# Patient Record
Sex: FEMALE | Race: WHITE | Employment: FULL TIME | URBAN - METROPOLITAN AREA
[De-identification: names, ages, dates, MRNs, and addresses within clinical notes are randomized per-mention and may not be internally consistent; named-entity substitution may affect disease eponyms.]

---

## 2018-09-07 ENCOUNTER — HOSPITAL ENCOUNTER (OUTPATIENT)
Dept: PHYSICAL THERAPY | Age: 38
Discharge: HOME | End: 2018-09-07
Payer: COMMERCIAL

## 2018-09-07 PROCEDURE — 97110 THERAPEUTIC EXERCISES: CPT

## 2018-09-07 PROCEDURE — 97161 PT EVAL LOW COMPLEX 20 MIN: CPT

## 2018-09-07 NOTE — PROGRESS NOTES
PHYSICAL THERAPY - DAILY TREATMENT NOTE Patient Name: Brendon Reilly        Date: 2018 : 1980   YES Patient  Verified Visit #:     Insurance: Payor: Emily Naidu / Plan: [employer]  CAPITATED PT / Product Type: Commerical / In time: 8 Out time: 8:40 Total Treatment Time: 40 Medicare Time Tracking (below) Total Timed Codes (min):  10 1:1 Treatment Time:  40 TREATMENT AREA =  L knee SUBJECTIVE Pain Level (on 0 to 10 scale):  2 / 10 Medication Changes/New allergies or changes in medical history, any new surgeries or procedures? NO    If yes, update Summary List  
Subjective Functional Status/Changes:  []  No changes reported See eval  
 
  
 
OBJECTIVE Modality rationale:     
 min [] Estim, type:   
                                      []  att     []  unatt     []  w/US     []  w/ice    []  w/heat  
 min []  Mechanical Traction: type/lbs   
                                           []  pro   []  sup   []  int   []  cont  
 min []  Ultrasound, settings/location:    
 min []  Iontophoresis:  []  take home patch w/ dexamethazone  
 min                                []  in clinic w/ dexamethazone  
 min []  Ice     []  Heat     position:   
 min []  Other:   
 
10 min Therapeutic Exercise:  [x]  See flow sheet  
[]  Other:     
[]  Added:    
to improve (function):   
[]  Changed:    
to improve (function):   
 
 min Therapeutic Activity:   
 
- min Manual Therapy:   
 
 min Gait Training:   
 
 min Patient Education:  YES  Reviewed HEP []  Progressed/Changed HEP based on: Other Objective/Functional Measures: 
 
See eval 
  
Post Treatment Pain Level (on 0 to 10) scale:   1  / 10 ASSESSMENT 
 
[]  See Progress Note/Recertification Patient will continue to benefit from skilled therapy to address remaining functional deficits: see eval  
Progress toward goals / Updated goals: PLAN 
 
 [x]  Upgrade activities as tolerated YES Continue plan of care  
[]  Discharge due to :   
[]  Other:   
 
Therapist: Jr Schneider PT Date: 9/7/2018 Time: 8:46 AM

## 2018-09-07 NOTE — PROGRESS NOTES
[facility] [address] [facility] PHYSICAL THERAPY AT [address] [facility], [address] [place] [address], [address] [place] [address] Drive, [address] [place] [address] - Phone: [phone]  Fax: [phone] PLAN OF CARE / STATEMENT OF MEDICAL NECESSITY FOR PHYSICAL THERAPY SERVICES Patient Name: Elvis Monroy : 1980 Medical  
Diagnosis: Left knee pain [M25.562] Treatment Diagnosis: L knee pain Onset Date: Chronic Referral Source: Veronique Sanchez MD Start of Care [facility] [place]): 2018 Prior Hospitalization: See medical history Provider #: [id number] Prior Level of Function: Chronic L knee pain Comorbidities: OA, Asthma Medications: Verified on Patient Summary List  
The Plan of Care and following information is based on the information from the initial evaluation.  
============================================================================== Assessment / key information:   Elvis Monroy is an active athletic 45 y.o. female with a chief c/o L knee pain, up to 5/10. The patient reports having a L ACL repair in . She has recently begun to have L knee pain with sports and activity. She has full L knee ext, but mildly limited L knee flexion. Ligamentous tests were negative, as were patellar compression tests. Her multi-segmental flexion was dysfunctional (due to decreased HS length). Her multi-segmental ext was also dysfunctional (due to decreased hip flexor length). Deep squat was also dysfunctional.  The patient would benefit from skilled physical therapy at this time.=========================================================================================== Eval Complexity: History LOW Complexity : Zero comorbidities / personal factors that will impact the outcome / POC;  Examination  HIGH Complexity : 4+ Standardized tests and measures addressing body structure, function, activity limitation and / or participation in recreation ; Presentation LOW Complexity : Stable, uncomplicated ;  Decision Making MEDIUM Complexity : FOTO score of 26-74; Overall Complexity LOW Problem List: pain affecting function, decrease ROM, decrease strength, edema affecting function, decrease ADL/ functional abilitiies, decrease activity tolerance and decrease flexibility/ joint mobility Treatment Plan may include any combination of the following: Therapeutic exercise, Therapeutic activities, Neuromuscular re-education, Physical agent/modality, Manual therapy and Patient education Patient / Family readiness to learn indicated by: asking questions, trying to perform skills and interest 
Persons(s) to be included in education: patient (P) Barriers to Learning/Limitations: None Measures taken:   
Patient Goal (s): \"get stronger and avoid surgery\" Patient self reported health status: good Rehabilitation Potential: good ? Short Term Goals: To be accomplished in  2  weeks: 1. Decrease max L knee pain to < 3/10 2. Pt compliant with HEP 
? Long Term Goals: To be accomplished in  4-5  weeks: 1. Pt able to run, jump and cut without L knee pain 2. All ADL's without L knee pain 3. Pt to report >=+4, on GROC, to indicate increased function 4. Patient Independent with HEP/selfcare Frequency / Duration:   Patient to be seen 2-3  times per week for 4-8  weeks: 
Patient / Caregiver education and instruction: self care, activity modification and exercisesTherapist Signature: Flip Plascencia PT, MDT Date: 9/7/2018 Certification Period: NA Time: 8:47 AM  
=========================================================================================== I certify that the above Physical Therapy Services are being furnished while the patient is under my care. I agree with the treatment plan and certify that this therapy is necessary. Physician Signature:       Date:      Time:  Please sign and return to [facility] or you may fax the signed copy to [phone]. Thank you.

## 2018-09-11 ENCOUNTER — HOSPITAL ENCOUNTER (OUTPATIENT)
Dept: PHYSICAL THERAPY | Age: 38
End: 2018-09-11
Payer: COMMERCIAL

## 2018-09-12 ENCOUNTER — HOSPITAL ENCOUNTER (OUTPATIENT)
Dept: PHYSICAL THERAPY | Age: 38
Discharge: HOME | End: 2018-09-12
Payer: COMMERCIAL

## 2018-09-12 PROCEDURE — 97110 THERAPEUTIC EXERCISES: CPT

## 2018-09-12 NOTE — PROGRESS NOTES
PHYSICAL THERAPY - DAILY TREATMENT NOTE   Patient Name: Shae Silverio        Date: 2018 : 1980   YES Patient  Verified Visit #:     Insurance: Payor: Jeramy Izaguirre / Plan: [employer]  CAPITATED PT / Product Type: Commerical / In time: 5:30 Out time: 6:20 Total Treatment Time: 50 TREATMENT AREA = Left knee pain [M25.562] SUBJECTIVE Pain Level (on 0 to 10 scale):  0  / 10 Medication Changes/New allergies or changes in medical history, any new surgeries or procedures? NO    If yes, update Summary List  
Subjective Functional Status/Changes:  []  No changes reported No L knee pain OBJECTIVE 50 min Therapeutic Exercise:  [x]  See flow sheet Rationale:      increase ROM, increase strength and improve balance to improve the patients ability to perform all LE ADL's Throughout tx min Patient Education:  Moiz Cervantes []  Progressed/Changed HEP based on: Other Objective/Functional Measures: 
 
Initiated therex Post Treatment Pain Level (on 0 to 10) scale:   0  / 10 ASSESSMENT Assessment/Changes in Function:  
 
B knee DVC during SL TRX squat and balance lunge 
  
[]  See Progress Note/Recertification Patient will continue to benefit from skilled PT services to modify and progress therapeutic interventions, address functional mobility deficits, address strength deficits, analyze and cue movement patterns, analyze and modify body mechanics/ergonomics, assess and modify postural abnormalities and address imbalance/dizziness to attain remaining goals. Progress toward goals / Updated goals: 
 
Continue strengthening to meet all goals PLAN [x]  Upgrade activities as tolerated YES Continue plan of care  
[]  Discharge due to :   
[]  Other:   
 
Therapist: Madan Carroll PT Date: 2018 Time: 6:42 PM  
Future Appointments Date Time Provider Rupinder Caruso 2018 9:30 AM Madan Carroll PT REHAB CENTER AT [facility]  
 9/18/2018 7:00 AM Silvino Mercedes, PT REHAB CENTER AT [facility]  
9/21/2018 7:30 AM Merle Farnsworth PTA REHAB CENTER AT [facility]  
9/25/2018 7:00 AM Silvino Mercedes, PT REHAB CENTER AT [facility]

## 2018-09-18 ENCOUNTER — HOSPITAL ENCOUNTER (OUTPATIENT)
Dept: PHYSICAL THERAPY | Age: 38
Discharge: HOME | End: 2018-09-18
Payer: COMMERCIAL

## 2018-09-18 PROCEDURE — 97110 THERAPEUTIC EXERCISES: CPT

## 2018-09-18 NOTE — PROGRESS NOTES
PHYSICAL THERAPY - DAILY TREATMENT NOTE   Patient Name: Oriana Engel        Date: 2018 : 1980   YES Patient  Verified Visit #:   3   of   12  Insurance: Payor: Jaymie Mazariegos / Plan: [employer]  CAPITATED PT / Product Type: Commerical / In time: 7:05 Out time: 7:48 Total Treatment Time: 37 TREATMENT AREA = Left knee pain [M25.562] SUBJECTIVE Pain Level (on 0 to 10 scale):  0  / 10 Medication Changes/New allergies or changes in medical history, any new surgeries or procedures? NO    If yes, update Summary List  
Subjective Functional Status/Changes:  []  No changes reported No pain or soreness after last session OBJECTIVE 43 min Therapeutic Exercise:  [x]  See flow sheet Rationale:      increase strength to improve the patients ability to return to dynamic LE activities Throughout Tx min Patient Education:  Neha Lackey []  Progressed/Changed HEP based on: Other Objective/Functional Measures: 
 
Increased wall sit time and added ladder drills Post Treatment Pain Level (on 0 to 10) scale:   0  / 10 ASSESSMENT Assessment/Changes in Function: No increased pain with progression of therex and plyometrric training 
  
[]  See Progress Note/Recertification Patient will continue to benefit from skilled PT services to modify and progress therapeutic interventions, address functional mobility deficits, address strength deficits, analyze and cue movement patterns, analyze and modify body mechanics/ergonomics, assess and modify postural abnormalities and address imbalance/dizziness to attain remaining goals. Progress toward goals / Updated goals: 
 
Continue strengthening to meet all goals PLAN [x]  Upgrade activities as tolerated YES Continue plan of care  
[]  Discharge due to :   
[]  Other:   
 
Therapist: Vaishnavi Gonzalez PT Date: 2018 Time: 7:06 AM  
Future Appointments Date Time Provider Rupinder Caruso 9/21/2018 7:30 AM Kristen Hernandez, PTA REHAB CENTER AT [facility] [address]  
9/25/2018 7:00 AM Porfirio Velasquez, PT REHAB CENTER AT [facility] [address]

## 2018-09-25 ENCOUNTER — APPOINTMENT (OUTPATIENT)
Dept: PHYSICAL THERAPY | Age: 38
End: 2018-09-25
Payer: COMMERCIAL

## 2018-09-26 ENCOUNTER — HOSPITAL ENCOUNTER (OUTPATIENT)
Dept: PHYSICAL THERAPY | Age: 38
Discharge: HOME | End: 2018-09-26
Payer: COMMERCIAL

## 2018-09-26 PROCEDURE — 97110 THERAPEUTIC EXERCISES: CPT

## 2018-09-26 NOTE — PROGRESS NOTES
PHYSICAL THERAPY - DAILY TREATMENT NOTE  8 Patient Name: Luc Castro        Date: 2018 : 1980   YES Patient  Verified Visit #:      12  Insurance: Payor: Stan Márquez / Plan: [employer]  CAPITATED PT / Product Type: Commerical / In time: 5:20 Out time: 6:03 Total Treatment Time: 37 TREATMENT AREA = Left knee pain [M25.562] SUBJECTIVE Pain Level (on 0 to 10 scale):  0  / 10 Medication Changes/New allergies or changes in medical history, any new surgeries or procedures? NO    If yes, update Summary List  
Subjective Functional Status/Changes:  []  No changes reported Played golf with other day with 3/10 L knee pain OBJECTIVE 43 min Therapeutic Exercise:  [x]  See flow sheet Rationale:      increase ROM and increase strength to improve the patients ability to perform all LE ADL's Throughout Tx min Patient Education:  Jeny No []  Progressed/Changed HEP based on: Other Objective/Functional Measures: 
 
Increased height of lateral step downs Post Treatment Pain Level (on 0 to 10) scale:   0  / 10 ASSESSMENT Assessment/Changes in Function: No increased pain with progression of therex 
  
[]  See Progress Note/Recertification Patient will continue to benefit from skilled PT services to modify and progress therapeutic interventions, address functional mobility deficits, address ROM deficits, address strength deficits, analyze and cue movement patterns, analyze and modify body mechanics/ergonomics, assess and modify postural abnormalities and address imbalance/dizziness to attain remaining goals. Progress toward goals / Updated goals: 
 
Continue strengthening to meet all goals PLAN [x]  Upgrade activities as tolerated YES Continue plan of care  
[]  Discharge due to :   
[]  Other:   
 
Therapist: Emeli Durham PT Date: 2018 Time: 6:03 PM  
No future appointments.

## 2018-10-01 ENCOUNTER — HOSPITAL ENCOUNTER (OUTPATIENT)
Dept: PHYSICAL THERAPY | Age: 38
Discharge: HOME | End: 2018-10-01
Payer: COMMERCIAL

## 2018-10-01 PROCEDURE — 97110 THERAPEUTIC EXERCISES: CPT

## 2018-10-01 NOTE — PROGRESS NOTES
PHYSICAL THERAPY - DAILY TREATMENT NOTE   Patient Name: Barby Maurer        Date: 10/1/2018 : 1980   YES Patient  Verified Visit #:      12  Insurance: Payor: Deon Rodriguez / Plan: [facility]  CAPITATED PT / Product Type: Commerical / In time: 5:05 Out time: 5:45 Total Treatment Time: 40 TREATMENT AREA = Left knee pain [M25.562] SUBJECTIVE Pain Level (on 0 to 10 scale):  0  / 10 Medication Changes/New allergies or changes in medical history, any new surgeries or procedures? NO    If yes, update Summary List  
Subjective Functional Status/Changes:  []  No changes reported No significant changes in function OBJECTIVE 40 min Therapeutic Exercise:  [x]  See flow sheet Rationale:      increase strength to improve the patients ability to run/jump Throughout Tx min Patient Education:  [place] []  Progressed/Changed HEP based on: Other Objective/Functional Measures: Tolerated and completed all therex Post Treatment Pain Level (on 0 to 10) scale:   0  / 10 ASSESSMENT Assessment/Changes in Function: No increased pain during therex 
  
[]  See Progress Note/Recertification Patient will continue to benefit from skilled PT services to modify and progress therapeutic interventions, address functional mobility deficits, address strength deficits, analyze and cue movement patterns, analyze and modify body mechanics/ergonomics, assess and modify postural abnormalities and address imbalance/dizziness to attain remaining goals. Progress toward goals / Updated goals: 
Continue strengthening to meet all goals PLAN [x]  Upgrade activities as tolerated YES Continue plan of care  
[]  Discharge due to :   
[]  Other:   
 
Therapist: Arminda Ovalle PT Date: 10/1/2018 Time: 5:06 PM  
Future Appointments Date Time Provider Rupinder Caruso 10/3/2018 6:00 PM Arminda Ovalle PT REHAB CENTER AT [facility]  
 
 
 
 Attending Only

## 2018-10-12 ENCOUNTER — APPOINTMENT (OUTPATIENT)
Dept: PHYSICAL THERAPY | Age: 38
End: 2018-10-12
Payer: COMMERCIAL

## 2018-10-18 ENCOUNTER — APPOINTMENT (OUTPATIENT)
Dept: PHYSICAL THERAPY | Age: 38
End: 2018-10-18
Payer: COMMERCIAL

## 2018-10-22 ENCOUNTER — HOSPITAL ENCOUNTER (OUTPATIENT)
Dept: PHYSICAL THERAPY | Age: 38
Discharge: HOME | End: 2018-10-22
Payer: COMMERCIAL

## 2018-10-22 PROCEDURE — 97110 THERAPEUTIC EXERCISES: CPT

## 2018-10-22 NOTE — PROGRESS NOTES
PHYSICAL THERAPY - DAILY TREATMENT NOTE Patient Name: Abhi Roberson        Date: 10/22/2018 : 1980    Patient  Verified: Prosper Baron Visit #:     Insurance: Payor: Sabino Lawton / Plan: [facility]  CAPITATED PT / Product Type: Commerical / In time: 5:00 Out time: 6:00 Total Treatment Time: 60 Medicare Time Tracking (below) Total Timed Codes (min):  - 1:1 Treatment Time:  -  
TREATMENT AREA/ DIAGNOSIS = Left knee pain [M25.562] SUBJECTIVE Pain Level (on 0 to 10 scale):  0  / 10 Medication Changes/New allergies or changes in medical history, any new surgeries or procedures? NO    If yes, update Summary List  
Subjective Functional Status/Changes:  []  No changes reported Pt reports that she is feeling her pain a little more today OBJECTIVE 60 min Therapeutic Exercise:  [x]  See flow sheet Rationale:      increase strength to improve the patients ability to perform ADLs without pain Throughout treatment session min Patient Education:  Yes    [x] Reviewed HEP []  Progressed/Changed HEP based on: Other Objective/Functional Measures: 
 
Pt had slight increase in pain during treatment session Post Treatment Pain Level (on 0 to 10) scale:   2-3  / 10 ASSESSMENT Assessment/Changes in Function: Pt needs mod cueing to avoid increased valgus stress at the right knee. []  See Progress Note/Recertification Patient will continue to benefit from skilled PT services to modify and progress therapeutic interventions, address functional mobility deficits and address strength deficits to attain remaining goals. Progress toward goals / Updated goals: 
Continue with current treatment plan PLAN [x]  Upgrade activities as tolerated YES Continue plan of care  
[]  Discharge due to :   
[]  Other:   
 
Therapist: Jeny VegaPT Date: 10/22/2018 Time: 6:52 PM  
 
  
Future Appointments Date Time Provider Rupinder Caruso 10/26/2018 10:30 AM Loyda Shanks, PT REHAB CENTER AT [facility]

## 2018-10-26 ENCOUNTER — HOSPITAL ENCOUNTER (OUTPATIENT)
Dept: PHYSICAL THERAPY | Age: 38
End: 2018-10-26
Payer: COMMERCIAL

## 2018-11-20 ENCOUNTER — HOSPITAL ENCOUNTER (OUTPATIENT)
Dept: PHYSICAL THERAPY | Age: 38
Discharge: HOME | End: 2018-11-20
Payer: COMMERCIAL

## 2018-11-20 PROCEDURE — 97110 THERAPEUTIC EXERCISES: CPT

## 2018-11-20 PROCEDURE — 97161 PT EVAL LOW COMPLEX 20 MIN: CPT

## 2018-11-20 NOTE — PROGRESS NOTES
PHYSICAL THERAPY - DAILY TREATMENT NOTE Patient Name: Gretel Velazquez        Date: 2018 : 1980   YES Patient  Verified Visit #:     Insurance: Payor: Wagner Segal / Plan: [employer]  CAPITATED PT / Product Type: Commerical / In time: 11:35 Out time: 12:25 Total Treatment Time: 50 Medicare Time Tracking (below) Total Timed Codes (min):  10 1:1 Treatment Time:  50 TREATMENT AREA =  L knee SUBJECTIVE Pain Level (on 0 to 10 scale):  0  / 10 Medication Changes/New allergies or changes in medical history, any new surgeries or procedures? NO    If yes, update Summary List  
Subjective Functional Status/Changes:  []  No changes reported See eval  
 
  
 
OBJECTIVE Modality rationale:  -  
 min [] Estim, type:  
                                      []  att     []  unatt     []  w/US     []  w/ice    []  w/heat 
 min []  Mechanical Traction: type/lbs   
                                           []  pro   []  sup   []  int   []  cont  
 min []  Ultrasound, settings/location:    
 min []  Iontophoresis:  []  take home patch w/ dexamethazone  
 min                                []  in clinic w/ dexamethazone  
 min []  Ice     []  Heat     position:   
 min []  Other:   
10 min Therapeutic Exercise:  [x]  See flow sheet  
[]  Other:     
[]  Added:    
to improve (function):   
[]  Changed:    
to improve (function):   
 min Therapeutic Activity:   
 min Manual Therapy:   
 min Gait Training:   
 min Patient Education:  YES  Reviewed HEP []  Progressed/Changed HEP based on: Other Objective/Functional Measures: 
 
See eval 
  
Post Treatment Pain Level (on 0 to 10) scale:   0  / 10 ASSESSMENT 
 
[]  See Progress Note/Recertification Patient will continue to benefit from skilled therapy to address remaining functional deficits: see eval  
Progress toward goals / Updated goals: 
- PLAN [x]  Upgrade activities as tolerated YES Continue plan of care []  Discharge due to :   
[]  Other:   
 
Therapist: Kiera Moseley PT Date: 11/20/2018 Time: 1:29 PM

## 2018-11-20 NOTE — PROGRESS NOTES
[facility] [address] [facility] PHYSICAL THERAPY AT [address] [facility], [address] [place] [address], [address], [address] [place] [address] - Phone: [phone]  Fax: [phone] PLAN OF CARE / STATEMENT OF MEDICAL NECESSITY FOR PHYSICAL THERAPY SERVICES Patient Name: Rajendra Dc : 1980 Medical  
Diagnosis: Left knee pain [M25.562] Treatment Diagnosis: L knee pain Onset Date:  Referral Source: Mary Cameron MD Start of Care [facility] [place] [place]): 2018 Prior Hospitalization: See medical history Provider #: [id number] Prior Level of Function: Pain free ADLs and sports Comorbidities: OA, Asthma Medications: Verified on Patient Summary List  
The Plan of Care and following information is based on the information from the initial evaluation.  
============================================================================== Assessment / key information:   Rajendra Dc is a 45 y.o. female with a chief c/o intermittent L knee pain, up to 4/10. The patient has an ACL repair 20 years ago, and she has some laxity with her R anterior drawer test.  She c/o intermittent pain with sports, stairs and working out. She has ful ROM and full LE strength. She lacks R trunk rotation (45 degrees, should be 50 degrees). She has good glute strength, but her glute medious contraction is delayed with prone hip extension. She has multiple foot/ankle issue, including a significant B forefoot varus and a B rearfoot inversion, both of which may contribute to knee pain. She has decreased B hip flexor length, with + Alexander test L and R.  SHe has + Aplys' Compression test on L and R knee. She has L knee pain with eccentric step down of > 1\". FOTO score = 68. The patient would benefit from skilled physical therapy at this time.=========================================================================================== Eval Complexity: History LOW Complexity : Zero comorbidities / personal factors that will impact the outcome / POC;  Examination  HIGH Complexity : 4+ Standardized tests and measures addressing body structure, function, activity limitation and / or participation in recreation ; Presentation LOW Complexity : Stable, uncomplicated ;  Decision Making MEDIUM Complexity : FOTO score of 26-74; Overall Complexity LOW Problem List: pain affecting function, decrease ROM, decrease strength, edema affecting function, impaired gait/ balance, decrease ADL/ functional abilitiies, decrease activity tolerance and decrease flexibility/ joint mobility Treatment Plan may include any combination of the following: Therapeutic exercise, Therapeutic activities, Neuromuscular re-education, Physical agent/modality, Manual therapy, Patient education and Other: Custom Orthotic fitting Patient / Family readiness to learn indicated by: asking questions, trying to perform skills and interest 
Persons(s) to be included in education: patient (P) Barriers to Learning/Limitations: None Measures taken:   
Patient Goal (s): \"strengthen muscles around my knee\" Patient self reported health status: good Rehabilitation Potential: good ? Short Term Goals: To be accomplished in  2  weeks: 1. Decrease max hip pain to < 2/10 2. Pt compliant with HEP 
? Long Term Goals: To be accomplished in  4-5  weeks: 1. All ADL's without B knee pain. 2.  Increase FOTO score to > 73, to indicate increased function 3. Pt able to perform >=4\" eccentric step down with L LE, without pain 4.  increase B hip flexor length, as indicated by negative B Alexander Test.  
Frequency / Duration:   Patient to be seen 2-3  times per week for 4-8  weeks: 
Patient / Caregiver education and instruction: self care, activity modification and exercisesTherapist Signature: Ricardo Lopez PT, MDT Date: 11/20/2018 Certification Period: NA Time: 1:31 PM  
=========================================================================== ================ I certify that the above Physical Therapy Services are being furnished while the patient is under my care. I agree with the treatment plan and certify that this therapy is necessary. Physician Signature:       Date:      Time:  Please sign and return to [facility] at [place] or you may fax the signed copy to [phone]. Thank you.

## 2018-11-27 ENCOUNTER — APPOINTMENT (OUTPATIENT)
Dept: PHYSICAL THERAPY | Age: 38
End: 2018-11-27
Payer: COMMERCIAL

## 2018-11-30 ENCOUNTER — APPOINTMENT (OUTPATIENT)
Dept: PHYSICAL THERAPY | Age: 38
End: 2018-11-30
Payer: COMMERCIAL

## 2018-12-03 ENCOUNTER — HOSPITAL ENCOUNTER (OUTPATIENT)
Dept: PHYSICAL THERAPY | Age: 38
Discharge: HOME | End: 2018-12-03
Payer: COMMERCIAL

## 2018-12-03 PROCEDURE — 97763 ORTHC/PROSTC MGMT SBSQ ENC: CPT

## 2018-12-03 PROCEDURE — 97110 THERAPEUTIC EXERCISES: CPT

## 2018-12-03 NOTE — PROGRESS NOTES
PHYSICAL THERAPY - DAILY TREATMENT NOTE Patient Name: Mp Braxton        Date: 12/3/2018 : 1980   YES Patient  Verified Visit #:   2   of   8  Insurance: Payor: [employer] / Plan: [employer]  CAPITATED PT / Product Type: Commerical / In time: 610 Out time: 7 Total Treatment Time: 50 TREATMENT AREA =  Left knee pain [M25.562] SUBJECTIVE Pain Level (on 0 to 10 scale):  0  / 10 Medication Changes/New allergies or changes in medical history, any new surgeries or procedures? NO    If yes, update Summary List  
Subjective Functional Status/Changes:  []  No changes reported Functional improvements: Exercises have helped her knee. Functional impairments: Pain after work, standing. OBJECTIVE 15 min Therapeutic Exercise:  [x]  See flow sheet Rationale:      increase ROM and increase strength to improve the patients ability to perform LE ADLs 30 min Orthotic Intervention/ Patient Education:  YES  Reviewed HEP []  Progressed/Changed HEP based on: Other Objective/Functional Measures: 
 WB/standing at medial left knee. Currently in cushion shoe with min stability. Objective: Gait shows bilat low WB and NWB arch. Ambulation shows minimal medial long arch collapse with gait, and min cacl EV. Mod forefoot abduction with stance noted. DF/ Gastroc length reduced B. Midfoot and first ray mobility hypermobile. Mod hallux callus buildupB Subtalar neutral assessment: FF and RF varus. Pt instructed in HEP for DF ROM, gastroc stretch. Post Treatment Pain Level (on 0 to 10) scale:  0*  / 10 ASSESSMENT Assessment/Changes in Function: Pt with improved understanding of importance of proper foot wear. 
  
[]  See Progress Note/Recertification Patient will continue to benefit from skilled PT services to modify and progress therapeutic interventions, address functional mobility deficits, address ROM deficits, address strength deficits and analyze and address soft tissue restrictions to attain remaining goals. Progress toward goals / Updated goals: Pt with updated goals: 
 
1. Independent with HEP. 2. Will f/u for orthotic pickup with good initial fit and will return for adjustments prn.  
3. Will acquire stability shoes to assist with pronation/IR of LE with stance. PLAN [x]  Upgrade activities as tolerated YES Continue plan of care  
[]  Discharge due to :   
[]  Other:   
 
Therapist: Cecilia Duron PT Date: 12/3/2018 Time: 6:29 PM  
 
Future Appointments Date Time Provider Rupinder Caruso 12/6/2018  6:00 PM Briseida Henao PT [facility]  
12/11/2018  6:00 PM Lili Ontiveros [facility]  
12/13/2018  4:00 PM Demi Hicks [facility]  
12/18/2018  6:00 PM Briseida Henao PT [facility]  
12/20/2018  6:00 PM Briseida Henao, PT [facility]

## 2018-12-04 ENCOUNTER — APPOINTMENT (OUTPATIENT)
Dept: PHYSICAL THERAPY | Age: 38
End: 2018-12-04
Payer: COMMERCIAL

## 2018-12-06 ENCOUNTER — HOSPITAL ENCOUNTER (OUTPATIENT)
Dept: PHYSICAL THERAPY | Age: 38
End: 2018-12-06
Payer: COMMERCIAL

## 2018-12-07 ENCOUNTER — HOSPITAL ENCOUNTER (OUTPATIENT)
Dept: PHYSICAL THERAPY | Age: 38
Discharge: HOME | End: 2018-12-07
Payer: COMMERCIAL

## 2018-12-07 PROCEDURE — 97110 THERAPEUTIC EXERCISES: CPT

## 2018-12-07 NOTE — PROGRESS NOTES
PHYSICAL THERAPY - DAILY TREATMENT NOTE Patient Name: Fior Early        Date: 2018 : 1980   YES Patient  Verified Visit #:   3  of   8  Insurance: Payor: Bety Ochoa / Plan: [facility]  CAPITATED PT / Product Type: Commerical / In time: 1105 Out time: 1150 Total Treatment Time: 45 TREATMENT AREA =  Left knee pain [M25.562] SUBJECTIVE Pain Level (on 0 to 10 scale):  0  / 10 Medication Changes/New allergies or changes in medical history, any new surgeries or procedures? NO    If yes, update Summary List  
Subjective Functional Status/Changes:  []  No changes reported Has been busy has not been able to do her exercises at home. Has not had any pain but has not been working out to provoke it. OBJECTIVE 45 min Therapeutic Exercise:  [x]  See flow sheet Rationale:      increase ROM and increase strength to improve the patients ability to perform LE ADLs  
  
T/o tx min Patient Education:  YES  Reviewed HEP []  Progressed/Changed HEP based on: Other Objective/Functional Measures: Addition of standing and supine strengthening this day. Pt with verbal and tactile cues to reduce lumbar extension with squats. Post Treatment Pain Level (on 0 to 10) scale:  0 / 10 ASSESSMENT Assessment/Changes in Function: Pt with improved understanding of importance of proper foot wear. Pt challenged with progression of bridging. Pt challenged with lateral step down, step height 2-4 inches with UE A. 
  
[]  See Progress Note/Recertification Patient will continue to benefit from skilled PT services to modify and progress therapeutic interventions, address functional mobility deficits, address ROM deficits, address strength deficits and analyze and address soft tissue restrictions to attain remaining goals. Progress toward goals / Updated goals: Pt with updated goals: 
 
1. Independent with HEP--progression this day. 2. Will f/u for orthotic pickup with good initial fit and will return for adjustments prn--pending arrival of orthotic. 3. Will acquire stability shoes to assist with pronation/IR of LE with stance--pt ed and in process of obtaining. PLAN [x]  Upgrade activities as tolerated YES Continue plan of care  
[]  Discharge due to :   
[]  Other:   
 
Therapist: Claudene Booty, PT Date: 12/7/2018 Time: 12 PM  
 
Future Appointments Date Time Provider Rupinder Caruso 12/11/2018  6:00 PM Huma Lugo PT [facility]  
12/13/2018  4:00 PM Oliva Simons [facility]  
12/18/2018  6:00 PM Huma Lugo PT [facility]  
12/20/2018  6:00 PM Huma Lugo PT [facility]

## 2018-12-11 ENCOUNTER — HOSPITAL ENCOUNTER (OUTPATIENT)
Dept: PHYSICAL THERAPY | Age: 38
Discharge: HOME | End: 2018-12-11
Payer: COMMERCIAL

## 2018-12-11 PROCEDURE — 97110 THERAPEUTIC EXERCISES: CPT

## 2018-12-11 NOTE — PROGRESS NOTES
PHYSICAL THERAPY - DAILY TREATMENT NOTE    Patient Name: Tasha Hernandez        Date: 2018  : 1980    Patient  Verified: YES  Visit #:   4   of   8  Insurance: Payor: Anette Olvera / Plan: [address] [place] [address] [facility] / Product Type: Commerical /      In time: 6 Out time: 6:55   Total Treatment Time: 55     Medicare Time Tracking (below)   Total Timed Codes (min):  55 1:1 Treatment Time:   55     TREATMENT AREA/ DIAGNOSIS = Left knee pain [M25.562]    SUBJECTIVE  Pain Level (on 0 to 10 scale):  0  / 10   Medication Changes/New allergies or changes in medical history, any new surgeries or procedures? NO    If yes, update Summary List   Subjective Functional Status/Changes:  []  No changes reported     Functional improvement no pain   Functional limitation      OBJECTIVE      55 min Therapeutic Exercise:  [x]  See flow sheet   Rationale:      increase ROM and increase strength to improve the patients ability to perform ADLs without pain          min Patient Education:  Yes    [x] Reviewed HEP   []  Progressed/Changed HEP based on: Other Objective/Functional Measures:    Did full workout, including glute prep, WGS, plyo/ladder, and PREs followed by stretching  Ladder included 2 in and 1 out, and 2 in and 2 out sideways  plyo included box jump, leap frog squat, power skip and lateral jumps   Post Treatment Pain Level (on 0 to 10) scale:   0  / 10     ASSESSMENT  Assessment/Changes in Function:     Pt needing minimal VCs for good form     []  See Progress Note/Recertification   Patient will continue to benefit from skilled PT services to modify and progress therapeutic interventions, address functional mobility deficits, address ROM deficits, address strength deficits, analyze and address soft tissue restrictions, analyze and cue movement patterns, analyze and modify body mechanics/ergonomics and assess and modify postural abnormalities to attain remaining goals. Pt tolerated therex well.       PLAN  [x] Upgrade activities as tolerated YES Continue plan of care   []  Discharge due to :    []  Other:      Therapist: Emily Quinn PT    Date: 12/11/2018 Time: 6:51 PM        Future Appointments   Date Time Provider Rupinder Caruso   12/13/2018  4:00 PM Emre Began [facility] AT [address]   12/18/2018  6:00 PM Katharina Chen PT [facility] AT [address]   12/20/2018  6:00 PM Katharina Chen PT [facility] AT [address]

## 2018-12-13 ENCOUNTER — HOSPITAL ENCOUNTER (OUTPATIENT)
Dept: PHYSICAL THERAPY | Age: 38
End: 2018-12-13
Payer: COMMERCIAL

## 2018-12-18 ENCOUNTER — APPOINTMENT (OUTPATIENT)
Dept: PHYSICAL THERAPY | Age: 38
End: 2018-12-18
Payer: COMMERCIAL

## 2018-12-20 ENCOUNTER — APPOINTMENT (OUTPATIENT)
Dept: PHYSICAL THERAPY | Age: 38
End: 2018-12-20
Payer: COMMERCIAL

## 2019-01-22 NOTE — PROGRESS NOTES
[facility] PHYSICAL THERAPY AT [address] [facility], [address] [place] [address], [address] [place] [address], [address] [place] [address]  Phone: [phone]  Fax: [phone] DISCHARGE SUMMARY Patient Name: Barby Maurer : 1980 Treatment/Medical Diagnosis: Left knee pain [M25.562] Referral Source: Bere Zaidi MD    
Date of Initial Visit: 19 Attended Visits: 4 Missed Visits: 5 SUMMARY OF TREATMENT 
LE strengthening program, including glute prep, pre workout stretching, PREs, and self care/HEP. CURRENT STATUS The patient has returned to therapy, and again has been discharged due to inconsistent attendance. She has learned a good HEP and self- care. Goals could not be formally assessed. RECOMMENDATIONS Discontinue therapy due to lack of attendance or compliance. If you have any questions/comments please contact us directly at [phone]. Thank you for allowing us to assist in the care of your patient. Therapist Signature: Lise Cid PT, MDT Date: 18 Reporting Period: 
 NA Time: 8:42 AM

## 2019-01-30 NOTE — PROGRESS NOTES
[facility] [address] [facility] PHYSICAL THERAPY AT [address] [facility], [address] [place] [address], [address] [place] [address], [address] [place] [address]  Phone: [phone]  Fax: [phone] DISCHARGE SUMMARY Patient Name: Safia Fernandez : 1980 Treatment/Medical Diagnosis: Left knee pain [M25.562] Referral Source: Bandar López MD    
Date of Initial Visit: 2018 Attended Visits: 6 Missed Visits: 8 SUMMARY OF TREATMENT 
PT consisted of manual therapy techniques, therapeutic exercises, modalities. CURRENT STATUS/ GOAL STATUS Pt was seen a total of 6 visits with 8 missed visits over a month and a half period. Pt no showed last visit and never returned to PT. At onset of last visit, pt reported no pain in the L knee be still has some discomfort with exercises. Unable to further assess progress towards goals at this time due to non-compliance/lack of attendance. DC at this time with no further instructions to the patient. Thank you for this referral. 
 
RECOMMENDATIONS Discontinue therapy due to lack of attendance or compliance. If you have any questions/comments please contact us directly at [phone]. Thank you for allowing us to assist in the care of your patient. Therapist Signature: Cheryle Riggers Date: 2018 Reporting Period: 
 2018-10/22/2018 Time: 11:17 AM